# Patient Record
Sex: FEMALE | Race: WHITE | NOT HISPANIC OR LATINO
[De-identification: names, ages, dates, MRNs, and addresses within clinical notes are randomized per-mention and may not be internally consistent; named-entity substitution may affect disease eponyms.]

---

## 2022-03-14 ENCOUNTER — APPOINTMENT (OUTPATIENT)
Dept: CARDIOLOGY | Facility: CLINIC | Age: 87
End: 2022-03-14
Payer: MEDICARE

## 2022-03-14 ENCOUNTER — NON-APPOINTMENT (OUTPATIENT)
Age: 87
End: 2022-03-14

## 2022-03-14 VITALS — SYSTOLIC BLOOD PRESSURE: 136 MMHG | DIASTOLIC BLOOD PRESSURE: 70 MMHG

## 2022-03-14 VITALS
TEMPERATURE: 98 F | WEIGHT: 182 LBS | HEART RATE: 67 BPM | OXYGEN SATURATION: 98 % | HEIGHT: 59 IN | BODY MASS INDEX: 36.69 KG/M2

## 2022-03-14 DIAGNOSIS — E78.5 HYPERLIPIDEMIA, UNSPECIFIED: ICD-10-CM

## 2022-03-14 DIAGNOSIS — Z78.9 OTHER SPECIFIED HEALTH STATUS: ICD-10-CM

## 2022-03-14 DIAGNOSIS — K21.9 GASTRO-ESOPHAGEAL REFLUX DISEASE W/OUT ESOPHAGITIS: ICD-10-CM

## 2022-03-14 DIAGNOSIS — I25.83 ATHEROSCLEROTIC HEART DISEASE OF NATIVE CORONARY ARTERY W/OUT ANGINA PECTORIS: ICD-10-CM

## 2022-03-14 DIAGNOSIS — R06.02 SHORTNESS OF BREATH: ICD-10-CM

## 2022-03-14 DIAGNOSIS — Z87.891 PERSONAL HISTORY OF NICOTINE DEPENDENCE: ICD-10-CM

## 2022-03-14 DIAGNOSIS — G62.9 POLYNEUROPATHY, UNSPECIFIED: ICD-10-CM

## 2022-03-14 DIAGNOSIS — Z82.49 FAMILY HISTORY OF ISCHEMIC HEART DISEASE AND OTHER DISEASES OF THE CIRCULATORY SYSTEM: ICD-10-CM

## 2022-03-14 DIAGNOSIS — E07.9 DISORDER OF THYROID, UNSPECIFIED: ICD-10-CM

## 2022-03-14 DIAGNOSIS — E11.9 TYPE 2 DIABETES MELLITUS W/OUT COMPLICATIONS: ICD-10-CM

## 2022-03-14 DIAGNOSIS — I25.10 ATHEROSCLEROTIC HEART DISEASE OF NATIVE CORONARY ARTERY W/OUT ANGINA PECTORIS: ICD-10-CM

## 2022-03-14 DIAGNOSIS — Z85.3 PERSONAL HISTORY OF MALIGNANT NEOPLASM OF BREAST: ICD-10-CM

## 2022-03-14 DIAGNOSIS — I10 ESSENTIAL (PRIMARY) HYPERTENSION: ICD-10-CM

## 2022-03-14 DIAGNOSIS — Z83.3 FAMILY HISTORY OF DIABETES MELLITUS: ICD-10-CM

## 2022-03-14 DIAGNOSIS — Z87.898 PERSONAL HISTORY OF OTHER SPECIFIED CONDITIONS: ICD-10-CM

## 2022-03-14 DIAGNOSIS — Z80.0 FAMILY HISTORY OF MALIGNANT NEOPLASM OF DIGESTIVE ORGANS: ICD-10-CM

## 2022-03-14 PROCEDURE — 93000 ELECTROCARDIOGRAM COMPLETE: CPT

## 2022-03-14 PROCEDURE — 99204 OFFICE O/P NEW MOD 45 MIN: CPT

## 2022-03-14 RX ORDER — LEVOTHYROXINE SODIUM 112 UG/1
112 TABLET ORAL DAILY
Refills: 0 | Status: ACTIVE | COMMUNITY
Start: 2022-03-14

## 2022-03-14 RX ORDER — GABAPENTIN 100 MG/1
100 CAPSULE ORAL 3 TIMES DAILY
Refills: 0 | Status: ACTIVE | COMMUNITY
Start: 2022-03-14

## 2022-03-14 RX ORDER — AMLODIPINE BESYLATE 5 MG/1
5 TABLET ORAL DAILY
Refills: 0 | Status: ACTIVE | COMMUNITY
Start: 2022-03-14

## 2022-03-14 RX ORDER — POTASSIUM CHLORIDE 750 MG/1
10 TABLET, FILM COATED, EXTENDED RELEASE ORAL
Refills: 0 | Status: ACTIVE | COMMUNITY
Start: 2022-03-14

## 2022-03-14 RX ORDER — INSULIN DEGLUDEC INJECTION 100 U/ML
100 INJECTION, SOLUTION SUBCUTANEOUS DAILY
Refills: 0 | Status: ACTIVE | COMMUNITY
Start: 2022-03-14

## 2022-03-14 RX ORDER — FUROSEMIDE 40 MG/1
40 TABLET ORAL DAILY
Refills: 0 | Status: ACTIVE | COMMUNITY
Start: 2022-03-14

## 2022-03-18 ENCOUNTER — NON-APPOINTMENT (OUTPATIENT)
Age: 87
End: 2022-03-18

## 2022-03-18 NOTE — ASSESSMENT
[FreeTextEntry1] : EKG 3/14/2022- Atrial  Rhythm \par P:QRS - 1:1, Abnormal P axis, H Rate 71\par -Old anteroseptal infarct. \par  Low voltage -possible pulmonary disease. \par \par Assessment:\par 1.  Coronary artery disease-patient has 4 coronary stents, last intervention in 11/2016\par 2.  Diabetes mellitus/hypertension/hyperlipidemia/thyroid disorder\par 3.  History of right breast cancer\par 4.  Remote history of smoking\par \par Recommendations:\par \par Based on the information provided to our office patient still taking aspirin and Brilinta and other medications as noted.  Patient did not bring her medication bottles.\par \par 1.  We will request her prior cardiology records-I requested my  to get her records from the previous cardiologist\par 2.  Patient requested to bring all of her medication bottles next visit.  If she is still taking aspirin and Brilinta, will consider stopping Brilinta next visit\par 3.  At this point no plans for echocardiogram or stress test until I review her records.\par 4.  Follow-up in 6 weeks

## 2022-03-18 NOTE — HISTORY OF PRESENT ILLNESS
[FreeTextEntry1] : Patient is an elderly 87-year-old  female with a history of right breast cancer status post surgical resection in 1996, diabetes mellitus type 2, coronary artery disease with a history of for 4 stents, last coronary intervention in November 2016, was followed by cardiologist Dr. Shelton, in [place], [place].  Patient has a history of hypertension, hyperlipidemia and thyroid disorder.  Patient presents as a new patient to my office because of her change in her insurance status.  Patient was seen by her cardiologist few months ago.  Patient currently denies any exertional chest tightness.  Patient denies any acute symptoms today.  Patient states she does not need any medication renewals.  Patient did not bring her medication bottles.  I do not have any of her prior cardiac records.  Patient denies orthopnea, PND or leg edema.\par \par Patient has no history of a prior TIA or CVA. Patient quit smoking many years ago.

## 2022-04-25 ENCOUNTER — APPOINTMENT (OUTPATIENT)
Dept: CARDIOLOGY | Facility: CLINIC | Age: 87
End: 2022-04-25

## 2022-04-29 ENCOUNTER — NON-APPOINTMENT (OUTPATIENT)
Age: 87
End: 2022-04-29

## 2022-05-02 ENCOUNTER — APPOINTMENT (OUTPATIENT)
Dept: CARDIOLOGY | Facility: CLINIC | Age: 87
End: 2022-05-02
Payer: MEDICARE

## 2022-05-02 VITALS
HEART RATE: 63 BPM | SYSTOLIC BLOOD PRESSURE: 130 MMHG | DIASTOLIC BLOOD PRESSURE: 70 MMHG | HEIGHT: 59 IN | OXYGEN SATURATION: 93 % | WEIGHT: 180 LBS | TEMPERATURE: 97.8 F | BODY MASS INDEX: 36.29 KG/M2

## 2022-05-02 DIAGNOSIS — R07.9 CHEST PAIN, UNSPECIFIED: ICD-10-CM

## 2022-05-02 DIAGNOSIS — F32.A DEPRESSION, UNSPECIFIED: ICD-10-CM

## 2022-05-02 DIAGNOSIS — I42.2 OTHER HYPERTROPHIC CARDIOMYOPATHY: ICD-10-CM

## 2022-05-02 DIAGNOSIS — R60.0 LOCALIZED EDEMA: ICD-10-CM

## 2022-05-02 DIAGNOSIS — I65.29 OCCLUSION AND STENOSIS OF UNSPECIFIED CAROTID ARTERY: ICD-10-CM

## 2022-05-02 PROCEDURE — 99214 OFFICE O/P EST MOD 30 MIN: CPT

## 2022-05-02 RX ORDER — ASPIRIN 81 MG/1
81 TABLET ORAL DAILY
Refills: 0 | Status: ACTIVE | COMMUNITY
Start: 2022-05-02

## 2022-05-02 RX ORDER — TICAGRELOR 60 MG/1
60 TABLET ORAL TWICE DAILY
Refills: 0 | Status: DISCONTINUED | COMMUNITY
Start: 2022-03-14 | End: 2022-05-02

## 2022-05-02 NOTE — REVIEW OF SYSTEMS
[Negative] : Psychiatric [FreeTextEntry5] : See HPI [de-identified] : Walks with the help of walker

## 2022-05-02 NOTE — PHYSICAL EXAM
[Normal] : normal gait [de-identified] : Bilateral 1+ pitting leg edema, in both lower extremities, no open wounds,

## 2022-05-02 NOTE — ASSESSMENT
[FreeTextEntry1] : PET myocardial perfusion study with a pharmacological stress test done on June 26, 2019:\par No myocardial perfusion defects noted.\par \par Carotid ultrasound showed bilateral nonobstructive stenosis with moderate plaque burden. \par \par \par EKG 3/14/2022- Atrial  Rhythm \par P:QRS - 1:1, Abnormal P axis, H Rate 71\par -Old anteroseptal infarct. \par  Low voltage -possible pulmonary disease. \par \par Echocardiogram done in January 10, 2020:\par Normal LV systolic function. Moderate concentric LVH. Prominent septal thickening and cavity obliteration in systole noted.\par showed LVOT obstruction with Valsalva velocity increases to 4.2 m/s. Maximum gradient 72, mean 26 mmHg. Moderate mitral stenosis with a mitral valve area 2.3 cm². Mean pressure gradient is 5.2 mmHg.\par \par \par \par Assessment:\par 1.  Coronary artery disease-patient has 4 coronary stents, last intervention in 11/2016\par 2.  Diabetes mellitus/hypertension/hyperlipidemia/thyroid disorder\par 3.  History of right breast cancer\par 4.  Remote history of smoking\par 5.  HCM\par 6.  Mild to moderate mitral stenosis\par 7. Carotid Stenosis\par 8.  Bilateral leg edema\par \par Recommendations:\par I had a long discussion with the patient regarding DAPT, at this stage she does not need to be taking Brilinta pros and cons were discussed.,  Patient is agreeable to stop Brilinta.\par \par 1.  Discontinue Brilinta\par 2.  Continue aspirin for life time\par 3.  Bilateral venous duplex\par 4.  Echocardiogram and carotid duplex\par 5.  Basic metabolic panel today\par 6.  If the basic metabolic panel is acceptable, we will consider adding an ACE or ARB and discontinue amlodipine which sometimes may cause legs edema\par 7.  Follow-up in 1 month\par 8.  Based on echo results, if she still has a significant LVOT gradient I may consider decreasing Lasix and increasing beta-blockers

## 2022-05-02 NOTE — HISTORY OF PRESENT ILLNESS
[FreeTextEntry1] : Patient is an elderly 87-year-old  female with a history of right breast cancer status post surgical resection in 1996, diabetes mellitus type 2, coronary artery disease with a history of for 4 stents, last coronary intervention in November 2016, was followed by cardiologist Dr. Shelton, in [place], [place].  Patient had a PCI to LAD in 2016.  Patient has a history of hypertension, hyperlipidemia and thyroid disorder.  Patient had to come to our office because Dr. Shelton no longer accepts her insurance.  \par \par Since her last visit, I have received and reviewed her records.  Patient now comes to the office because of the bilateral leg swelling for the last month.  Patient states that she was given Tradjenta by her endocrinologist, patient felt some chest discomfort, she called her endocrinologist and the medication was stopped.  Patient now reports that since she took the medication, she has noted bilateral leg swelling.  Patient denies any fever or chills.  Patient denies chest pain, orthopnea and PND.  Patient brought all of her medications.  Patient has been taking aspirin and Brilinta since 2016.  Patient is also on amlodipine for hypertension.\par \par Patient has no history of a prior TIA or CVA. Patient quit smoking many years ago.\par \par Patient is accompanied by her caretaker.

## 2022-05-09 LAB
ANION GAP SERPL CALC-SCNC: 12 MMOL/L
BUN SERPL-MCNC: 21 MG/DL
CALCIUM SERPL-MCNC: 9.9 MG/DL
CHLORIDE SERPL-SCNC: 100 MMOL/L
CO2 SERPL-SCNC: 28 MMOL/L
CREAT SERPL-MCNC: 0.94 MG/DL
EGFR: 59 ML/MIN/1.73M2
GLUCOSE SERPL-MCNC: 208 MG/DL
POTASSIUM SERPL-SCNC: 4.9 MMOL/L
SODIUM SERPL-SCNC: 139 MMOL/L

## 2022-05-12 ENCOUNTER — APPOINTMENT (OUTPATIENT)
Dept: CARDIOLOGY | Facility: CLINIC | Age: 87
End: 2022-05-12
Payer: MEDICARE

## 2022-05-12 PROCEDURE — 93306 TTE W/DOPPLER COMPLETE: CPT

## 2022-05-12 PROCEDURE — 93880 EXTRACRANIAL BILAT STUDY: CPT

## 2022-05-19 ENCOUNTER — APPOINTMENT (OUTPATIENT)
Dept: CARDIOLOGY | Facility: CLINIC | Age: 87
End: 2022-05-19
Payer: MEDICARE

## 2022-05-19 PROCEDURE — 93970 EXTREMITY STUDY: CPT

## 2022-05-20 ENCOUNTER — APPOINTMENT (OUTPATIENT)
Dept: CARDIOLOGY | Facility: CLINIC | Age: 87
End: 2022-05-20

## 2022-06-10 ENCOUNTER — APPOINTMENT (OUTPATIENT)
Dept: UROLOGY | Facility: CLINIC | Age: 87
End: 2022-06-10
Payer: MEDICARE

## 2022-06-10 PROCEDURE — 99203 OFFICE O/P NEW LOW 30 MIN: CPT

## 2022-06-10 NOTE — REVIEW OF SYSTEMS
[Negative] : Endocrine [Dry Eyes] : dryness of the eyes [Shortness Of Breath] : shortness of breath [Constipation] : constipation [Arthralgias] : arthralgias [Joint Pain] : joint pain [Dizziness] : dizziness [Difficulty Walking] : difficulty walking

## 2022-06-10 NOTE — ASSESSMENT
[FreeTextEntry1] : Overflow incontinence:\par will do trial of tamsulosin\par urine culture\par discussed side effect of drowsiness, nasal congestion. \par pt also has constipation - need management\par f/u in 3 weeks for pvr. If still elevated, may need urodynamics

## 2022-06-10 NOTE — PHYSICAL EXAM
[General Appearance - Well Developed] : well developed [General Appearance - Well Nourished] : well nourished [Normal Appearance] : normal appearance [Well Groomed] : well groomed [General Appearance - In No Acute Distress] : no acute distress [Edema] : no peripheral edema [Respiration, Rhythm And Depth] : normal respiratory rhythm and effort [Exaggerated Use Of Accessory Muscles For Inspiration] : no accessory muscle use [Abdomen Soft] : soft [Abdomen Tenderness] : non-tender [FreeTextEntry1] : pt walks with a walker [] : no rash [No Focal Deficits] : no focal deficits [Oriented To Time, Place, And Person] : oriented to person, place, and time [Affect] : the affect was normal [Mood] : the mood was normal [Not Anxious] : not anxious

## 2022-06-10 NOTE — LETTER BODY
[Dear  ___] : Dear  [unfilled], [Consult Letter:] : I had the pleasure of evaluating your patient, [unfilled]. [Please see my note below.] : Please see my note below. [Consult Closing:] : Thank you very much for allowing me to participate in the care of this patient.  If you have any questions, please do not hesitate to contact me. [Sincerely,] : Sincerely, [FreeTextEntry3] : Elle Hatfield, \par Genitourinary Medicine\par [facility]\par

## 2022-06-10 NOTE — HISTORY OF PRESENT ILLNESS
[FreeTextEntry1] : 86yo F presents for urinary incontinence\par Pt reports she wets her self without having any sensation. \par She has to wear a diaper day and night as her incontinence can happen anytime. She doesn't get any urge sensation. \par \par PVR 263cc in office.

## 2022-06-13 ENCOUNTER — NON-APPOINTMENT (OUTPATIENT)
Age: 87
End: 2022-06-13

## 2022-06-13 ENCOUNTER — APPOINTMENT (OUTPATIENT)
Dept: CARDIOLOGY | Facility: CLINIC | Age: 87
End: 2022-06-13

## 2022-06-18 LAB — BACTERIA UR CULT: NORMAL

## 2022-06-27 ENCOUNTER — APPOINTMENT (OUTPATIENT)
Dept: UROLOGY | Facility: CLINIC | Age: 87
End: 2022-06-27
Payer: MEDICARE

## 2022-06-27 PROCEDURE — 99213 OFFICE O/P EST LOW 20 MIN: CPT

## 2022-06-27 NOTE — PHYSICAL EXAM
[General Appearance - Well Developed] : well developed [General Appearance - Well Nourished] : well nourished [Normal Appearance] : normal appearance [Well Groomed] : well groomed [General Appearance - In No Acute Distress] : no acute distress [Abdomen Soft] : soft [Abdomen Tenderness] : non-tender [Edema] : no peripheral edema [] : no respiratory distress [Respiration, Rhythm And Depth] : normal respiratory rhythm and effort [Exaggerated Use Of Accessory Muscles For Inspiration] : no accessory muscle use [Oriented To Time, Place, And Person] : oriented to person, place, and time [Affect] : the affect was normal [Mood] : the mood was normal [Not Anxious] : not anxious [FreeTextEntry1] : pt walks with a walker [No Focal Deficits] : no focal deficits

## 2022-06-27 NOTE — HISTORY OF PRESENT ILLNESS
[FreeTextEntry1] : 86yo F presents for urinary incontinence\par Pt reports she wets her self without having any sensation. \par She has to wear a diaper day and night as her incontinence can happen anytime. She doesn't get any urge sensation. \par She had 7 vaginal deliveries has grade 1 cystocele. \par She tried course of flomax 2 weeks but retention and incontinence didn't improve. \par \par PVR 193cc in office today\par PVR 263cc 2 weeks ago

## 2022-06-27 NOTE — ASSESSMENT
[FreeTextEntry1] : Overflow incontinence:\par trail of tamsulosin for 2 weeks didn't change her symptoms\par She still has nocturnal enuresis. \par will schedule urodynamics study

## 2022-06-27 NOTE — LETTER BODY
[Dear  ___] : Dear  [unfilled], [Consult Letter:] : I had the pleasure of evaluating your patient, [unfilled]. [Please see my note below.] : Please see my note below. [Consult Closing:] : Thank you very much for allowing me to participate in the care of this patient.  If you have any questions, please do not hesitate to contact me. [Sincerely,] : Sincerely, [FreeTextEntry3] : Elle Hatfield, \par Genitourinary Medicine\par [facility]\par

## 2022-06-27 NOTE — REVIEW OF SYSTEMS
[Dry Eyes] : dryness of the eyes [Shortness Of Breath] : shortness of breath [Constipation] : constipation [Arthralgias] : arthralgias [Joint Pain] : joint pain [Dizziness] : dizziness [Difficulty Walking] : difficulty walking [Negative] : Endocrine

## 2022-07-05 ENCOUNTER — APPOINTMENT (OUTPATIENT)
Dept: UROLOGY | Facility: CLINIC | Age: 87
End: 2022-07-05

## 2022-07-05 VITALS
BODY MASS INDEX: 36.29 KG/M2 | SYSTOLIC BLOOD PRESSURE: 165 MMHG | WEIGHT: 180 LBS | DIASTOLIC BLOOD PRESSURE: 53 MMHG | HEIGHT: 59 IN | TEMPERATURE: 97 F | HEART RATE: 73 BPM

## 2022-07-05 LAB
BILIRUB UR QL STRIP: NEGATIVE
CLARITY UR: CLEAR
COLLECTION METHOD: NORMAL
GLUCOSE UR-MCNC: NEGATIVE
HCG UR QL: 0.2 EU/DL
HGB UR QL STRIP.AUTO: NEGATIVE
KETONES UR-MCNC: NEGATIVE
LEUKOCYTE ESTERASE UR QL STRIP: NEGATIVE
NITRITE UR QL STRIP: NEGATIVE
PH UR STRIP: 5.5
PROT UR STRIP-MCNC: NEGATIVE
SP GR UR STRIP: 1.01

## 2022-07-05 PROCEDURE — 81003 URINALYSIS AUTO W/O SCOPE: CPT | Mod: QW

## 2022-07-05 PROCEDURE — 51797 INTRAABDOMINAL PRESSURE TEST: CPT

## 2022-07-05 PROCEDURE — 51784 ANAL/URINARY MUSCLE STUDY: CPT

## 2022-07-05 PROCEDURE — 51741 ELECTRO-UROFLOWMETRY FIRST: CPT

## 2022-07-05 PROCEDURE — 99213 OFFICE O/P EST LOW 20 MIN: CPT | Mod: 25

## 2022-07-05 PROCEDURE — 51728 CYSTOMETROGRAM W/VP: CPT

## 2022-07-05 NOTE — REVIEW OF SYSTEMS
[Dry Eyes] : dryness of the eyes [Arthralgias] : arthralgias [Joint Pain] : joint pain [Difficulty Walking] : difficulty walking [Negative] : Endocrine

## 2022-07-07 NOTE — LETTER BODY
[Dear  ___] : Dear  [unfilled], [Consult Letter:] : I had the pleasure of evaluating your patient, [unfilled]. [Please see my note below.] : Please see my note below. [Consult Closing:] : Thank you very much for allowing me to participate in the care of this patient.  If you have any questions, please do not hesitate to contact me. [Sincerely,] : Sincerely, [FreeTextEntry3] : Elle Hatfield, \par Genitourinary Medicine\par [facility]\par

## 2022-07-07 NOTE — LETTER BODY
[Dear  ___] : Dear  [unfilled], [Consult Letter:] : I had the pleasure of evaluating your patient, [unfilled]. [Please see my note below.] : Please see my note below. [Consult Closing:] : Thank you very much for allowing me to participate in the care of this patient.  If you have any questions, please do not hesitate to contact me. [Sincerely,] : Sincerely, [FreeTextEntry3] : Elle Hatfield, \par Genitourinary Medicine\par [facility]\par

## 2022-07-07 NOTE — HISTORY OF PRESENT ILLNESS
[FreeTextEntry1] : 88yo F presents for urodynamics study\par She has been taking tamsulosin and still reports nocturnal enuresis. She doesn't realize she wets the bed \par \par She has to wear a diaper day and night as her incontinence can happen anytime. She doesn't get any urge sensation. \par She had 7 vaginal deliveries has grade 1 cystocele. \par \par She also has grade 2+ bilateral lower extremity edema\par \par

## 2022-07-07 NOTE — ASSESSMENT
[FreeTextEntry1] : Overflow incontinence:\par Urodynamics showed no obstructive component. \par Her bladder contractility is normal, however she doesn't completely empty bladder - 260ml pvr\par ?if retention is related to prolapse although urodynamics didn't show obstruction\par \par Nocturnal enuresis:\par will start desmopressin 0.2mg low dose\par check bmp 1 week\par c/w flomax\par pt has b/l lower extremity swelling - advised to keep legs elevated 2-3 hours before sleep\par f/u 1 month\par

## 2022-07-07 NOTE — PHYSICAL EXAM
[General Appearance - Well Developed] : well developed [General Appearance - Well Nourished] : well nourished [Normal Appearance] : normal appearance [Well Groomed] : well groomed [General Appearance - In No Acute Distress] : no acute distress [Abdomen Soft] : soft [Edema] : no peripheral edema [] : no respiratory distress [Respiration, Rhythm And Depth] : normal respiratory rhythm and effort [Exaggerated Use Of Accessory Muscles For Inspiration] : no accessory muscle use [Oriented To Time, Place, And Person] : oriented to person, place, and time [Affect] : the affect was normal [Mood] : the mood was normal [Not Anxious] : not anxious [No Focal Deficits] : no focal deficits [FreeTextEntry1] : pt walks with a walker; b/l 2+ pitting edema

## 2022-07-15 ENCOUNTER — APPOINTMENT (OUTPATIENT)
Dept: CARDIOLOGY | Facility: CLINIC | Age: 87
End: 2022-07-15

## 2022-07-15 VITALS
WEIGHT: 197 LBS | BODY MASS INDEX: 39.72 KG/M2 | TEMPERATURE: 97.8 F | HEIGHT: 59 IN | SYSTOLIC BLOOD PRESSURE: 132 MMHG | OXYGEN SATURATION: 96 % | HEART RATE: 80 BPM | DIASTOLIC BLOOD PRESSURE: 62 MMHG

## 2022-07-15 PROCEDURE — 93000 ELECTROCARDIOGRAM COMPLETE: CPT

## 2022-07-15 PROCEDURE — 99213 OFFICE O/P EST LOW 20 MIN: CPT | Mod: 25

## 2022-07-15 RX ORDER — TRIAMCINOLONE ACETONIDE 0.5 MG/G
0.05 OINTMENT TOPICAL
Qty: 430 | Refills: 0 | Status: ACTIVE | COMMUNITY
Start: 2022-06-09

## 2022-07-15 RX ORDER — OMEPRAZOLE 40 MG/1
40 CAPSULE, DELAYED RELEASE ORAL
Qty: 90 | Refills: 0 | Status: DISCONTINUED | COMMUNITY
Start: 2022-03-14 | End: 2022-07-15

## 2022-07-15 RX ORDER — CITALOPRAM HYDROBROMIDE 40 MG/1
40 TABLET, FILM COATED ORAL DAILY
Refills: 0 | Status: DISCONTINUED | COMMUNITY
Start: 2022-05-02 | End: 2022-07-15

## 2022-07-15 RX ORDER — INSULIN DEGLUDEC INJECTION 100 U/ML
100 INJECTION, SOLUTION SUBCUTANEOUS
Qty: 15 | Refills: 0 | Status: ACTIVE | COMMUNITY
Start: 2022-05-09

## 2022-07-15 RX ORDER — OMEPRAZOLE, SODIUM BICARBONATE 40; 1100 MG/1; MG/1
40-1100 CAPSULE ORAL
Qty: 90 | Refills: 0 | Status: ACTIVE | COMMUNITY
Start: 2022-05-16

## 2022-07-15 RX ORDER — LINAGLIPTIN 5 MG/1
5 TABLET, FILM COATED ORAL
Qty: 90 | Refills: 0 | Status: DISCONTINUED | COMMUNITY
Start: 2022-04-01

## 2022-07-15 RX ORDER — PITAVASTATIN CALCIUM 2.09 MG/1
2 TABLET, FILM COATED ORAL DAILY
Refills: 0 | Status: ACTIVE | COMMUNITY
Start: 2022-03-14

## 2022-07-15 RX ORDER — CARBIDOPA AND LEVODOPA 25; 100 MG/1; MG/1
25-100 TABLET ORAL
Qty: 90 | Refills: 0 | Status: DISCONTINUED | COMMUNITY
Start: 2022-06-13

## 2022-07-15 RX ORDER — METOPROLOL SUCCINATE 50 MG/1
50 TABLET, EXTENDED RELEASE ORAL
Refills: 0 | Status: ACTIVE | COMMUNITY
Start: 2022-03-14

## 2022-07-15 RX ORDER — NYSTATIN 100000 U/G
100000 OINTMENT TOPICAL
Qty: 30 | Refills: 0 | Status: ACTIVE | COMMUNITY
Start: 2022-06-08

## 2022-07-15 RX ORDER — PEN NEEDLE, DIABETIC 32GX 5/32"
32G X 5 MM NEEDLE, DISPOSABLE MISCELLANEOUS
Qty: 400 | Refills: 0 | Status: ACTIVE | COMMUNITY
Start: 2022-03-09

## 2022-07-15 RX ORDER — CLEMASTINE FUMARATE 0.67 MG/5ML
0.67 SYRUP ORAL
Qty: 600 | Refills: 0 | Status: ACTIVE | COMMUNITY
Start: 2022-05-16

## 2022-07-15 NOTE — ASSESSMENT
[FreeTextEntry1] : PET myocardial perfusion study with a pharmacological stress test done on June 26, 2019:\par No myocardial perfusion defects noted.\par \par Carotid ultrasound showed bilateral nonobstructive stenosis with moderate plaque burden. \par \par \par EKG 3/14/2022- Atrial  Rhythm \par P:QRS - 1:1, Abnormal P axis, H Rate 71\par -Old anteroseptal infarct. \par  Low voltage -possible pulmonary disease. \par \par Echocardiogram done in January 10, 2020:\par Normal LV systolic function. Moderate concentric LVH. Prominent septal thickening and cavity obliteration in systole noted.showed LVOT obstruction with Valsalva velocity increases to 4.2 m/s. Maximum gradient 72, mean 26 mmHg. Moderate mitral stenosis with a mitral valve area 2.3 cm². Mean pressure gradient is 5.2 mmHg.\par \par Echocardiogram May 12, 2022: LVEF 70 to 75%. Normal RV size and function. Mild mitral stenosis.\par \par Carotid duplex May 12, 2022: Mild to moderate atherosclerosis noted in the right carotid system. No evidence of hemodynamically significant stenosis. Left carotid system-mild atherosclerosis without evidence of hemodynamically significant stenosis.\par \par Bilateral venous duplex: May 19, 2022: No evidence of DVT.\par \par Basic metabolic panel-glucose 208 otherwise unremarkable.\par \par EKG 7/15/2022- NSR, Q waves in V1 and V2.\par \par Assessment:\par 1.  Coronary artery disease-patient has 4 coronary stents, last intervention in 11/2016\par 2.  Diabetes mellitus/hypertension/hyperlipidemia/thyroid disorder\par 3.  History of right breast cancer\par 4.  Remote history of smoking\par 5.  HCM-on recent echo there was no gradient\par 6.  Mild to moderate mitral stenosis\par \par \par Recommendations:\par \par 1.  Decrease Lasix to 20 mg daily \par 2.  Continue aspirin for life time\par 3.  Continue other medications as recommended by Dr. Vila\par 4.  Follow-up in 6 months\par

## 2022-07-15 NOTE — REVIEW OF SYSTEMS
[Negative] : Psychiatric [FreeTextEntry5] : See HPI [de-identified] : Walks with the help of walker denies pain/discomfort

## 2022-07-15 NOTE — HISTORY OF PRESENT ILLNESS
[FreeTextEntry1] : Patient is an elderly 87-year-old  female with a history of right breast cancer status post surgical resection in 1996, diabetes mellitus type 2, coronary artery disease with a history of for 4 stents, last coronary intervention in November 2016, was followed by cardiologist Dr. Shelton, in [place], [place].  Patient had a PCI to LAD in 2016.  Patient has a history of hypertension, hyperlipidemia and thyroid disorder. \par \par Patient presents to the office for a follow-up.  Patient denies any cardiopulmonary complaints.\par Patient is off Brilinta. \par \par Patient has no history of a prior TIA or CVA. Patient quit smoking many years ago.\par \par Patient did bring all of her medication bottles to the office which was verified with our medication list.

## 2022-07-15 NOTE — PHYSICAL EXAM
[Normal] : normal gait [de-identified] : Bilateral 1+ pitting leg edema, in both lower extremities, no open wounds,

## 2022-08-08 ENCOUNTER — APPOINTMENT (OUTPATIENT)
Dept: UROLOGY | Facility: CLINIC | Age: 87
End: 2022-08-08

## 2022-08-08 VITALS
HEIGHT: 59 IN | DIASTOLIC BLOOD PRESSURE: 72 MMHG | SYSTOLIC BLOOD PRESSURE: 154 MMHG | BODY MASS INDEX: 39.72 KG/M2 | WEIGHT: 197 LBS | TEMPERATURE: 96.5 F

## 2022-08-08 PROCEDURE — 99214 OFFICE O/P EST MOD 30 MIN: CPT

## 2022-08-08 NOTE — LETTER BODY
[Dear  ___] : Dear  [unfilled], [Consult Letter:] : I had the pleasure of evaluating your patient, [unfilled]. [Please see my note below.] : Please see my note below. [Consult Closing:] : Thank you very much for allowing me to participate in the care of this patient.  If you have any questions, please do not hesitate to contact me. [Sincerely,] : Sincerely, [FreeTextEntry3] : Elle Hatfield, \par Genitourinary Medicine\par [facility]\par

## 2022-08-08 NOTE — REVIEW OF SYSTEMS
[Negative] : Endocrine [Dry Eyes] : dryness of the eyes [Lower Ext Edema] : lower extremity edema [Arthralgias] : arthralgias [Joint Pain] : joint pain [Difficulty Walking] : difficulty walking

## 2022-08-08 NOTE — HISTORY OF PRESENT ILLNESS
[FreeTextEntry1] : 88yo F presents for urodynamics study\par \par She reports her bedwetting has stopped after started desmopressin. \par She also takes tamsulosin 1 capsule daily, which also helps her urination. \par Last BMP showed normal electrolyte levels. \par She also takes 20mg lasix daily  \par \par She had 7 vaginal deliveries has grade 1 cystocele. \par \par She also has grade 2+ bilateral lower extremity edema\par \par

## 2022-08-08 NOTE — ASSESSMENT
[FreeTextEntry1] : Overflow incontinence / Incomplete bladder emptying:\par refilled tamsulosin\par take daily\par \par Nocturnal enuresis:\par c/w desmopressin 0.2mg low dose\par check bmp  in 1 month\par advised not to hydrate too much throughout day as she is also on lasix. \par pt has b/l lower extremity swelling - advised to keep legs elevated 2-3 hours before sleep\par f/u 3 months

## 2022-08-31 ENCOUNTER — RX RENEWAL (OUTPATIENT)
Age: 87
End: 2022-08-31

## 2022-12-01 ENCOUNTER — APPOINTMENT (OUTPATIENT)
Dept: UROLOGY | Facility: CLINIC | Age: 87
End: 2022-12-01

## 2022-12-01 VITALS
DIASTOLIC BLOOD PRESSURE: 80 MMHG | SYSTOLIC BLOOD PRESSURE: 154 MMHG | WEIGHT: 197 LBS | HEIGHT: 59 IN | BODY MASS INDEX: 39.72 KG/M2

## 2022-12-01 PROCEDURE — 99214 OFFICE O/P EST MOD 30 MIN: CPT

## 2022-12-01 NOTE — HISTORY OF PRESENT ILLNESS
[FreeTextEntry1] : 87yo F presents for f/u\par \par She reports nighttime urination has decreased since starting desmopressin. She still has incontinence during day and occasionally at night.  She takes gapapentin for neuropathy 100mg tid\par She also takes tamsulosin 1 capsule daily, which also helps her urination. \par Last BMP showed normal electrolyte levels. \par She also takes 20mg lasix daily  \par \par She had 7 vaginal deliveries has grade 1 cystocele. \par Urodynamics showed weak sphincter \par \par She also has grade 2+ bilateral lower extremity edema\par \par

## 2022-12-01 NOTE — ASSESSMENT
[FreeTextEntry1] : Overflow incontinence / Incomplete bladder emptying:\par refilled tamsulosin\par advised to cut down gabapentin and observe symptoms\par Symptoms likely also related to weak sphincter - pt not amenable for any procedures\par \par Nocturnal enuresis:\par c/w desmopressin 0.2mg low dose\par check bmp\par advised not to hydrate too much throughout day as she is also on lasix. \par pt has b/l lower extremity swelling - advised to keep legs elevated 2-3 hours before sleep\par f/u 3 months\par \par

## 2022-12-05 ENCOUNTER — APPOINTMENT (OUTPATIENT)
Dept: UROLOGY | Facility: CLINIC | Age: 87
End: 2022-12-05

## 2023-01-20 ENCOUNTER — APPOINTMENT (OUTPATIENT)
Dept: CARDIOLOGY | Facility: CLINIC | Age: 88
End: 2023-01-20

## 2023-03-02 ENCOUNTER — APPOINTMENT (OUTPATIENT)
Dept: UROLOGY | Facility: CLINIC | Age: 88
End: 2023-03-02

## 2023-03-23 ENCOUNTER — RX RENEWAL (OUTPATIENT)
Age: 88
End: 2023-03-23

## 2023-03-27 ENCOUNTER — APPOINTMENT (OUTPATIENT)
Dept: UROLOGY | Facility: CLINIC | Age: 88
End: 2023-03-27
Payer: MEDICARE

## 2023-03-27 VITALS
SYSTOLIC BLOOD PRESSURE: 121 MMHG | DIASTOLIC BLOOD PRESSURE: 90 MMHG | RESPIRATION RATE: 14 BRPM | HEIGHT: 59 IN | BODY MASS INDEX: 40.32 KG/M2 | HEART RATE: 89 BPM | WEIGHT: 200 LBS

## 2023-03-27 LAB
ANION GAP SERPL CALC-SCNC: 9 MMOL/L
BUN SERPL-MCNC: 17 MG/DL
CALCIUM SERPL-MCNC: 9.6 MG/DL
CHLORIDE SERPL-SCNC: 102 MMOL/L
CO2 SERPL-SCNC: 27 MMOL/L
CREAT SERPL-MCNC: 0.79 MG/DL
EGFR: 72 ML/MIN/1.73M2
GLUCOSE SERPL-MCNC: 97 MG/DL
POTASSIUM SERPL-SCNC: 4.9 MMOL/L
SODIUM SERPL-SCNC: 138 MMOL/L

## 2023-03-27 PROCEDURE — 99214 OFFICE O/P EST MOD 30 MIN: CPT

## 2023-03-27 NOTE — HISTORY OF PRESENT ILLNESS
[FreeTextEntry1] : 89yo F presents for f/u\par \par She reports nighttime urination has decreased since starting desmopressin. She still has incontinence during day and occasionally at night.  She takes gapapentin for neuropathy 100mg tid\par She also takes tamsulosin 1 capsule daily, which also helps her urination. \par \par She also takes 20mg lasix daily  \par \par She had 7 vaginal deliveries has grade 1 cystocele. \par Urodynamics showed weak sphincter \par \par She also has grade 2+ bilateral lower extremity edema\par \par

## 2023-03-27 NOTE — LETTER BODY
[Dear  ___] : Dear  [unfilled], [Consult Letter:] : I had the pleasure of evaluating your patient, [unfilled]. [Please see my note below.] : Please see my note below. [Consult Closing:] : Thank you very much for allowing me to participate in the care of this patient.  If you have any questions, please do not hesitate to contact me. [Sincerely,] : Sincerely, [FreeTextEntry3] : Elle Hatfield, \par Genitourinary Medicine\par [facility]\par

## 2023-03-27 NOTE — PHYSICAL EXAM
[General Appearance - Well Developed] : well developed [General Appearance - Well Nourished] : well nourished [Normal Appearance] : normal appearance [Well Groomed] : well groomed [General Appearance - In No Acute Distress] : no acute distress [] : no respiratory distress [Edema] : no peripheral edema [Respiration, Rhythm And Depth] : normal respiratory rhythm and effort [Exaggerated Use Of Accessory Muscles For Inspiration] : no accessory muscle use [Oriented To Time, Place, And Person] : oriented to person, place, and time [Affect] : the affect was normal [Mood] : the mood was normal [Not Anxious] : not anxious [FreeTextEntry1] : pt walks with a walker; b/l 2+ pitting edema [No Focal Deficits] : no focal deficits

## 2023-03-27 NOTE — REVIEW OF SYSTEMS
[Negative] : Heme/Lymph [Dry Eyes] : dryness of the eyes [Lower Ext Edema] : lower extremity edema [Arthralgias] : arthralgias [Joint Pain] : joint pain [Difficulty Walking] : difficulty walking

## 2023-03-27 NOTE — ASSESSMENT
[FreeTextEntry1] : Overflow incontinence / Incomplete bladder emptying:\par c/w tamsulosin\par advised to cut down gabapentin and observe symptoms\par Symptoms likely also related to weak sphincter - pt not amenable for any procedures\par \par Nocturnal enuresis:\par c/w desmopressin 0.2mg low dose\par check bmp - call pt if abnormal\par advised not to hydrate too much throughout day as she is also on lasix. \par pt has b/l lower extremity swelling - advised to keep legs elevated 2-3 hours before sleep\par f/u 4 months\par \par

## 2023-07-24 ENCOUNTER — APPOINTMENT (OUTPATIENT)
Dept: UROLOGY | Facility: CLINIC | Age: 88
End: 2023-07-24
Payer: MEDICARE

## 2023-07-24 VITALS
SYSTOLIC BLOOD PRESSURE: 147 MMHG | HEIGHT: 59 IN | DIASTOLIC BLOOD PRESSURE: 76 MMHG | BODY MASS INDEX: 41.73 KG/M2 | WEIGHT: 207 LBS | HEART RATE: 89 BPM

## 2023-07-24 DIAGNOSIS — R33.9 RETENTION OF URINE, UNSPECIFIED: ICD-10-CM

## 2023-07-24 DIAGNOSIS — R32 UNSPECIFIED URINARY INCONTINENCE: ICD-10-CM

## 2023-07-24 LAB
ANION GAP SERPL CALC-SCNC: 11 MMOL/L
BUN SERPL-MCNC: 18 MG/DL
CALCIUM SERPL-MCNC: 9.1 MG/DL
CHLORIDE SERPL-SCNC: 98 MMOL/L
CO2 SERPL-SCNC: 27 MMOL/L
CREAT SERPL-MCNC: 0.97 MG/DL
EGFR: 56 ML/MIN/1.73M2
GLUCOSE SERPL-MCNC: 199 MG/DL
POTASSIUM SERPL-SCNC: 4.9 MMOL/L
SODIUM SERPL-SCNC: 135 MMOL/L

## 2023-07-24 PROCEDURE — 99214 OFFICE O/P EST MOD 30 MIN: CPT

## 2023-07-24 RX ORDER — CARBIDOPA AND LEVODOPA 25; 100 MG/1; MG/1
25-100 TABLET ORAL
Refills: 0 | Status: ACTIVE | COMMUNITY
Start: 2023-07-24

## 2023-07-24 NOTE — ASSESSMENT
[FreeTextEntry1] : Overflow incontinence / Incomplete bladder emptying:\par c/w tamsulosin\par advised to cut down gabapentin and observe symptoms\par Symptoms likely also related to weak sphincter - pt not amenable for any procedures\par \par Nocturnal enuresis:\par c/w desmopressin 0.2mg low dose\par check bmp - call pt if abnormal\par she takes lasix as needed\par pt has b/l lower extremity swelling - advised to keep legs elevated 2-3 hours before sleep\par f/u 6 months\par \par 
Universal Safety Interventions

## 2023-07-24 NOTE — PHYSICAL EXAM
[General Appearance - Well Developed] : well developed [General Appearance - Well Nourished] : well nourished [Normal Appearance] : normal appearance [Well Groomed] : well groomed [General Appearance - In No Acute Distress] : no acute distress [] : no respiratory distress [Respiration, Rhythm And Depth] : normal respiratory rhythm and effort [Exaggerated Use Of Accessory Muscles For Inspiration] : no accessory muscle use [Oriented To Time, Place, And Person] : oriented to person, place, and time [Affect] : the affect was normal [Mood] : the mood was normal [Not Anxious] : not anxious [No Focal Deficits] : no focal deficits [FreeTextEntry1] : pt walks with a walker; b/l 2+ pitting edema

## 2023-07-31 ENCOUNTER — APPOINTMENT (OUTPATIENT)
Dept: UROLOGY | Facility: CLINIC | Age: 88
End: 2023-07-31

## 2023-10-16 ENCOUNTER — RX RENEWAL (OUTPATIENT)
Age: 88
End: 2023-10-16

## 2023-10-16 RX ORDER — DESMOPRESSIN ACETATE 0.2 MG/1
0.2 TABLET ORAL
Qty: 90 | Refills: 1 | Status: ACTIVE | COMMUNITY
Start: 2022-07-05 | End: 1900-01-01

## 2024-01-02 ENCOUNTER — RX RENEWAL (OUTPATIENT)
Age: 89
End: 2024-01-02

## 2024-01-02 RX ORDER — TAMSULOSIN HYDROCHLORIDE 0.4 MG/1
0.4 CAPSULE ORAL
Qty: 30 | Refills: 1 | Status: ACTIVE | COMMUNITY
Start: 2022-06-10 | End: 1900-01-01

## 2024-01-29 ENCOUNTER — APPOINTMENT (OUTPATIENT)
Dept: UROLOGY | Facility: CLINIC | Age: 89
End: 2024-01-29

## 2025-02-27 ENCOUNTER — APPOINTMENT (OUTPATIENT)
Dept: UROLOGY | Facility: CLINIC | Age: 89
End: 2025-02-27
Payer: MEDICARE

## 2025-02-27 VITALS
SYSTOLIC BLOOD PRESSURE: 170 MMHG | BODY MASS INDEX: 39.39 KG/M2 | HEART RATE: 105 BPM | DIASTOLIC BLOOD PRESSURE: 82 MMHG | WEIGHT: 195 LBS

## 2025-02-27 DIAGNOSIS — R32 UNSPECIFIED URINARY INCONTINENCE: ICD-10-CM

## 2025-02-27 DIAGNOSIS — R33.9 RETENTION OF URINE, UNSPECIFIED: ICD-10-CM

## 2025-02-27 PROCEDURE — 99214 OFFICE O/P EST MOD 30 MIN: CPT

## 2025-03-03 LAB — BACTERIA UR CULT: ABNORMAL

## 2025-03-03 RX ORDER — CEFDINIR 300 MG/1
300 CAPSULE ORAL
Qty: 10 | Refills: 0 | Status: ACTIVE | COMMUNITY
Start: 2025-03-03 | End: 1900-01-01

## 2025-03-28 ENCOUNTER — APPOINTMENT (OUTPATIENT)
Dept: UROLOGY | Facility: CLINIC | Age: 89
End: 2025-03-28
Payer: MEDICARE

## 2025-03-28 VITALS
HEIGHT: 59 IN | HEART RATE: 104 BPM | DIASTOLIC BLOOD PRESSURE: 88 MMHG | BODY MASS INDEX: 39.31 KG/M2 | OXYGEN SATURATION: 99 % | WEIGHT: 195 LBS | RESPIRATION RATE: 14 BRPM | SYSTOLIC BLOOD PRESSURE: 184 MMHG

## 2025-03-28 DIAGNOSIS — R33.9 RETENTION OF URINE, UNSPECIFIED: ICD-10-CM

## 2025-03-28 DIAGNOSIS — R32 UNSPECIFIED URINARY INCONTINENCE: ICD-10-CM

## 2025-03-28 DIAGNOSIS — R39.9 UNSPECIFIED SYMPTOMS AND SIGNS INVOLVING THE GENITOURINARY SYSTEM: ICD-10-CM

## 2025-03-28 PROCEDURE — 99214 OFFICE O/P EST MOD 30 MIN: CPT

## 2025-03-28 RX ORDER — VIBEGRON 75 MG/1
75 TABLET, FILM COATED ORAL
Qty: 90 | Refills: 0 | Status: ACTIVE | COMMUNITY
Start: 2025-03-28 | End: 1900-01-01

## 2025-04-01 LAB — BACTERIA UR CULT: ABNORMAL

## 2025-04-01 RX ORDER — CIPROFLOXACIN HYDROCHLORIDE 500 MG/1
500 TABLET, FILM COATED ORAL TWICE DAILY
Qty: 10 | Refills: 0 | Status: ACTIVE | COMMUNITY
Start: 2025-04-01 | End: 1900-01-01

## 2025-07-17 ENCOUNTER — APPOINTMENT (OUTPATIENT)
Dept: UROLOGY | Facility: CLINIC | Age: 89
End: 2025-07-17
Payer: MEDICARE

## 2025-07-17 VITALS
SYSTOLIC BLOOD PRESSURE: 139 MMHG | DIASTOLIC BLOOD PRESSURE: 74 MMHG | WEIGHT: 190 LBS | HEART RATE: 92 BPM | BODY MASS INDEX: 38.38 KG/M2

## 2025-07-17 PROCEDURE — 99214 OFFICE O/P EST MOD 30 MIN: CPT
